# Patient Record
Sex: FEMALE | Race: WHITE | NOT HISPANIC OR LATINO | Employment: UNEMPLOYED | URBAN - METROPOLITAN AREA
[De-identification: names, ages, dates, MRNs, and addresses within clinical notes are randomized per-mention and may not be internally consistent; named-entity substitution may affect disease eponyms.]

---

## 2017-01-04 ENCOUNTER — HISTORICAL (OUTPATIENT)
Dept: ADMINISTRATIVE | Facility: HOSPITAL | Age: 37
End: 2017-01-04

## 2017-06-06 ENCOUNTER — OFFICE VISIT (OUTPATIENT)
Dept: FAMILY MEDICINE | Facility: CLINIC | Age: 37
End: 2017-06-06
Payer: OTHER GOVERNMENT

## 2017-06-06 VITALS
OXYGEN SATURATION: 100 % | BODY MASS INDEX: 24.07 KG/M2 | DIASTOLIC BLOOD PRESSURE: 70 MMHG | HEART RATE: 87 BPM | SYSTOLIC BLOOD PRESSURE: 112 MMHG | WEIGHT: 141 LBS | HEIGHT: 64 IN

## 2017-06-06 DIAGNOSIS — F41.9 ANXIETY: ICD-10-CM

## 2017-06-06 DIAGNOSIS — Z00.00 PHYSICAL EXAM, ANNUAL: Primary | ICD-10-CM

## 2017-06-06 DIAGNOSIS — R09.81 NASAL CONGESTION: ICD-10-CM

## 2017-06-06 DIAGNOSIS — N60.09: ICD-10-CM

## 2017-06-06 PROBLEM — N63.0 MASS OF BREAST: Status: ACTIVE | Noted: 2017-06-06

## 2017-06-06 PROBLEM — N83.209 CYST OF OVARY: Status: ACTIVE | Noted: 2017-06-06

## 2017-06-06 PROBLEM — Z80.3 FAMILY HISTORY OF MALIGNANT NEOPLASM OF BREAST: Status: ACTIVE | Noted: 2017-06-06

## 2017-06-06 PROCEDURE — 99395 PREV VISIT EST AGE 18-39: CPT | Mod: ,,, | Performed by: FAMILY MEDICINE

## 2017-06-06 RX ORDER — BUPROPION HYDROCHLORIDE 150 MG/1
150 TABLET, EXTENDED RELEASE ORAL DAILY
Qty: 90 TABLET | Refills: 1 | Status: SHIPPED | OUTPATIENT
Start: 2017-06-06

## 2017-06-06 RX ORDER — FLUTICASONE PROPIONATE 50 MCG
1 SPRAY, SUSPENSION (ML) NASAL DAILY
Qty: 1 BOTTLE | Refills: 0 | Status: SHIPPED | OUTPATIENT
Start: 2017-06-06

## 2017-06-06 NOTE — PROGRESS NOTES
SUBJECTIVE:   HPI: Michelle Case  is a 36 y.o. female who presents for annual physical .   Annual Exam    Anxiety   Presents for follow-up visit. Patient reports no chest pain, compulsions, dizziness, excessive worry, nausea, nervous/anxious behavior, shortness of breath or suicidal ideas. Symptoms occur occasionally. The quality of sleep is fair.     Compliance with medications is %.     Review of Systems   Constitutional: Negative for fatigue, fever and unexpected weight change.   HENT: Negative for congestion, hearing loss and sore throat.    Eyes: Negative for visual disturbance.   Respiratory: Negative for cough, chest tightness and shortness of breath.    Cardiovascular: Negative for chest pain and leg swelling.   Gastrointestinal: Negative for abdominal pain, constipation, diarrhea, nausea and vomiting.   Genitourinary: Negative for difficulty urinating.   Musculoskeletal: Negative for back pain.   Neurological: Negative for dizziness, weakness and headaches.   Hematological: Negative for adenopathy.   Psychiatric/Behavioral: Negative for suicidal ideas. The patient is not nervous/anxious.         (Not in a hospital admission)  Review of patient's allergies indicates:  No Known Allergies  Past Medical History:   Diagnosis Date    Anxiety      Past Surgical History:   Procedure Laterality Date    DENTAL SURGERY      back 4 molars removed    DILATION AND CURETTAGE OF UTERUS      DILATION AND CURETTAGE OF UTERUS  2004    HYSTERECTOMY  04/18/2016    total, right ovary intact, 2/2 ovary mass    MOUTH SURGERY  2004    WISDOM TOOTH EXTRACTION      all 4     Family History   Problem Relation Age of Onset    Hypertension Mother     Depression Mother     COPD Father     No Known Problems Brother      Social History   Substance Use Topics    Smoking status: Former Smoker     Quit date: 10/14/2015    Smokeless tobacco: Never Used    Alcohol use No      Health Maintenance Topics with due status:  Not Due       Topic Last Completion Date    Pap Smear with HPV Cotest 01/01/2016    Influenza Vaccine 12/06/2016    MAMMOGRAM EARLY SCREENING 01/04/2017    TETANUS VACCINE  ( declines)  06/06/2017       There is no immunization history on file for this patient.  OBJECTIVE:      Vitals:    06/06/17 1446   BP: 112/70   Pulse: 87     Physical Exam   Constitutional: She is oriented to person, place, and time. She appears well-developed and well-nourished.   HENT:   Head: Normocephalic and atraumatic.   Right Ear: External ear normal.   Left Ear: External ear normal.   Nose: Nose normal.   Mouth/Throat: Oropharynx is clear and moist.   Eyes: EOM are normal. Pupils are equal, round, and reactive to light. Right eye exhibits no discharge. Left eye exhibits no discharge.   Neck: Normal range of motion. Neck supple.   Cardiovascular: Normal rate, regular rhythm, normal heart sounds and intact distal pulses.    No murmur heard.  Pulmonary/Chest: Effort normal and breath sounds normal. No respiratory distress. She has no wheezes.   Abdominal: Soft. Bowel sounds are normal. She exhibits no distension. There is no tenderness. There is no rebound and no guarding.   Musculoskeletal: Normal range of motion. She exhibits no edema, tenderness or deformity.   Lymphadenopathy:     She has no cervical adenopathy.   Neurological: She is alert and oriented to person, place, and time. No cranial nerve deficit. Coordination normal.   Skin: Skin is warm and dry. No rash noted. No erythema.   Psychiatric: She has a normal mood and affect.      Assessment:       1. Physical exam, annual    2. Cyst, breast, unspecified laterality    3. Anxiety    4. Nasal congestion        Plan:       Physical exam, annual  -     CBC auto differential; Future; Expected date: 06/06/2017  -     Comprehensive metabolic panel; Future; Expected date: 06/06/2017  -     Lipid panel; Future; Expected date: 06/06/2017  -     TSH; Future    Cyst, breast, unspecified  laterality  Comments:  7/2017: cyst on both breast, need repeat mammogram in 1/2018.     Anxiety  Comments:  was on Wellbutrin before 2013.   wellbutrin start in 2/2016. stable.   Orders:  -     buPROPion (WELLBUTRIN SR) 150 MG TBSR 12 hr tablet; Take 1 tablet (150 mg total) by mouth once daily.  Dispense: 90 tablet; Refill: 1  -     CBC auto differential; Future; Expected date: 06/06/2017  -     Comprehensive metabolic panel; Future; Expected date: 06/06/2017  -     Lipid panel; Future; Expected date: 06/06/2017  -     TSH; Future    Nasal congestion  -     fluticasone (FLONASE) 50 mcg/actuation nasal spray; 1 spray by Each Nare route once daily.  Dispense: 1 Bottle; Refill: 0        Counseled on age appropriate medical preventative services, including age appropriate cancer screenings, over all nutritional health, need for a consistent exercise regimen and an over all push towards maintaining a vigorous and active lifestyle.      Counseled on age appropriate vaccines and discussed upcoming health care needs based on age/gender.  Return in about 3 months (around 9/6/2017) for anxiety.      6/6/2017 Roxanne Little M.D.

## 2017-06-13 ENCOUNTER — TELEPHONE (OUTPATIENT)
Dept: FAMILY MEDICINE | Facility: CLINIC | Age: 37
End: 2017-06-13

## 2017-06-13 LAB
ALBUMIN SERPL-MCNC: 4.3 G/DL (ref 3.1–4.7)
ALP SERPL-CCNC: 81 IU/L (ref 40–104)
ALT (SGPT): 15 IU/L (ref 3–33)
AST SERPL-CCNC: 16 IU/L (ref 10–40)
BASOPHILS NFR BLD: 0.1 K/UL (ref 0–0.2)
BASOPHILS NFR BLD: 0.8 %
BILIRUB SERPL-MCNC: 0.9 MG/DL (ref 0.3–1)
BUN SERPL-MCNC: 19 MG/DL (ref 8–20)
CALCIUM SERPL-MCNC: 8.9 MG/DL (ref 7.7–10.4)
CHLORIDE: 104 MMOL/L (ref 98–110)
CO2 SERPL-SCNC: 27 MMOL/L (ref 22.8–31.6)
CREATININE: 0.62 MG/DL (ref 0.6–1.4)
EOSINOPHIL NFR BLD: 0.2 K/UL (ref 0–0.7)
EOSINOPHIL NFR BLD: 2.8 %
ERYTHROCYTE [DISTWIDTH] IN BLOOD BY AUTOMATED COUNT: 15.1 % (ref 11.7–14.9)
GLUCOSE: 93 MG/DL (ref 70–99)
GRAN #: 4.1 K/UL (ref 1.4–6.5)
GRAN%: 65.8 %
HCT VFR BLD AUTO: 38.4 % (ref 36–48)
HGB BLD-MCNC: 12 G/DL (ref 12–15)
IMMATURE GRANS (ABS): 0 K/UL (ref 0–1)
IMMATURE GRANULOCYTES: 0.3 %
LYMPH #: 1.4 K/UL (ref 1.2–3.4)
LYMPH%: 23.3 %
MCH RBC QN AUTO: 25.6 PG (ref 25–35)
MCHC RBC AUTO-ENTMCNC: 31.3 G/DL (ref 31–36)
MCV RBC AUTO: 81.9 FL (ref 79–98)
MONO #: 0.4 K/UL (ref 0.1–0.6)
MONO%: 7 %
NUCLEATED RBCS: 0 %
PLATELET # BLD AUTO: 294 K/UL (ref 140–440)
PMV BLD AUTO: 11.9 FL (ref 8.8–12.7)
POTASSIUM SERPL-SCNC: 3.8 MMOL/L (ref 3.5–5)
PROT SERPL-MCNC: 7.4 G/DL (ref 6–8.2)
RBC # BLD AUTO: 4.69 M/UL (ref 3.5–5.5)
SODIUM: 140 MMOL/L (ref 134–144)
TSH SERPL DL<=0.005 MIU/L-ACNC: 1.81 ULU/ML (ref 0.3–5.6)
WBC # BLD AUTO: 6.2 K/UL (ref 5–10)

## 2017-06-13 NOTE — TELEPHONE ENCOUNTER
----- Message from Roxanne Little MD sent at 6/13/2017 11:29 AM CDT -----  Notify pt : mild  hyperlipidemia, life style changes  for HLD.  The lab results will be reviewed in detail in the next visit.

## 2017-06-13 NOTE — PROGRESS NOTES
Notify pt : mild  hyperlipidemia, life style changes  for HLD.  The lab results will be reviewed in detail in the next visit.